# Patient Record
(demographics unavailable — no encounter records)

---

## 2025-01-14 NOTE — HISTORY OF PRESENT ILLNESS
[FreeTextEntry1] : follow up [de-identified] : follow up  insomnia- takes xanax for sleep   migraines- see neuro- ubreyly helps but not covered

## 2025-01-14 NOTE — HEALTH RISK ASSESSMENT
[Yes] : Yes [2 - 4 times a month (2 pts)] : 2-4 times a month (2 points) [1 or 2 (0 pts)] : 1 or 2 (0 points) [Never (0 pts)] : Never (0 points) [No] : In the past 12 months have you used drugs other than those required for medical reasons? No [No falls in past year] : Patient reported no falls in the past year [0] : 2) Feeling down, depressed, or hopeless: Not at all (0) [de-identified] : no [de-identified] : no [Audit-CScore] : 2 [de-identified] : Active  [de-identified] : fair  [[id number]] : 0

## 2025-01-14 NOTE — ASSESSMENT
[FreeTextEntry1] : follow up  insomnia- takes Xanax for sleep consider switching back to temazepam after Xanax is finished    migraines- see neuro- ubreyly helps but not covered will try to order   chest tightness- referral to cardiology

## 2025-06-13 NOTE — HEALTH RISK ASSESSMENT
[Yes] : In the past 12 months have you used drugs other than those required for medical reasons? Yes [No falls in past year] : Patient reported no falls in the past year [0] : 2) Feeling down, depressed, or hopeless: Not at all (0) [Never] : Never [de-identified] : none [de-identified] : none [de-identified] : Marijuana

## 2025-06-13 NOTE — HISTORY OF PRESENT ILLNESS
[FreeTextEntry1] : follow up [de-identified] : follow up  insomnia- takes xanax for sleep switched to temazepam has been sleeping better  migraines qulipta helps  needs renewal